# Patient Record
Sex: MALE | Race: WHITE | NOT HISPANIC OR LATINO | Employment: FULL TIME | URBAN - NONMETROPOLITAN AREA
[De-identification: names, ages, dates, MRNs, and addresses within clinical notes are randomized per-mention and may not be internally consistent; named-entity substitution may affect disease eponyms.]

---

## 2017-12-15 ENCOUNTER — OFFICE VISIT (OUTPATIENT)
Dept: URGENT CARE | Facility: PHYSICIAN GROUP | Age: 32
End: 2017-12-15
Payer: OTHER MISCELLANEOUS

## 2017-12-15 VITALS
SYSTOLIC BLOOD PRESSURE: 142 MMHG | TEMPERATURE: 98 F | WEIGHT: 178 LBS | HEIGHT: 68 IN | HEART RATE: 68 BPM | BODY MASS INDEX: 26.98 KG/M2 | OXYGEN SATURATION: 97 % | RESPIRATION RATE: 12 BRPM | DIASTOLIC BLOOD PRESSURE: 86 MMHG

## 2017-12-15 DIAGNOSIS — S05.02XA ABRASION OF LEFT CORNEA, INITIAL ENCOUNTER: ICD-10-CM

## 2017-12-15 DIAGNOSIS — S00.212A ABRASION OF LEFT EYELID, INITIAL ENCOUNTER: ICD-10-CM

## 2017-12-15 PROCEDURE — 99204 OFFICE O/P NEW MOD 45 MIN: CPT | Performed by: FAMILY MEDICINE

## 2017-12-15 RX ORDER — OFLOXACIN 3 MG/ML
SOLUTION/ DROPS OPHTHALMIC
Qty: 5 ML | Refills: 1 | Status: SHIPPED | OUTPATIENT
Start: 2017-12-15

## 2017-12-15 NOTE — PROGRESS NOTES
Chief Complaint:    Chief Complaint   Patient presents with   • Foreign Body in Eye       History of Present Illness:    Here with girlfriend. This is a new problem. Last night, felt like something got in his left eye. Still feels like something is in his left eye, mild-moderate severity. Wears contacts, but has them out now and using glasses. No change in vision. No similar prior episodes that have persisted this long.      Review of Systems:    Constitutional: Negative for fever, chills, and diaphoresis.   Eyes: See HPI.  ENT: Negative for ear pain, ear discharge, hearing loss, tinnitus, nasal congestion, nosebleeds, and sore throat.    Respiratory: Negative for cough, hemoptysis, sputum production, shortness of breath, wheezing, and stridor.    Cardiovascular: Negative for chest pain, palpitations, orthopnea, claudication, leg swelling, and PND.   Gastrointestinal: Negative for abdominal pain, nausea, vomiting, diarrhea, constipation, blood in stool, and melena.   Genitourinary: Negative for dysuria, urinary urgency, urinary frequency, hematuria, and flank pain.   Musculoskeletal: Negative for myalgias, joint pain, neck pain, and back pain.   Skin: Negative for rash and itching.   Neurological: Negative for dizziness, tingling, tremors, sensory change, speech change, focal weakness, seizures, loss of consciousness, and headaches.   Endo: Negative for polydipsia.   Heme: Does not bruise/bleed easily.   Psychiatric/Behavioral: Negative for depression, suicidal ideas, hallucinations, memory loss and substance abuse. The patient is not nervous/anxious and does not have insomnia.      Past Medical History:    History reviewed. No pertinent past medical history.    Past Surgical History:    History reviewed. No pertinent surgical history.    Social History:    Social History     Social History   • Marital status: Single     Spouse name: N/A   • Number of children: N/A   • Years of education: N/A     Social History Main  "Topics   • Smoking status: Never Smoker   • Smokeless tobacco: Never Used   • Alcohol use Not on file   • Drug use: Unknown   • Sexual activity: Not on file     Other Topics Concern   • Not on file     Social History Narrative   • No narrative on file       Family History:    History reviewed. No pertinent family history.    Medications:    No current outpatient prescriptions on file prior to visit.     No current facility-administered medications on file prior to visit.        Allergies:    No Known Allergies      Vitals:    Vitals:    12/15/17 1256   BP: 142/86   Pulse: 68   Resp: 12   Temp: 36.7 °C (98 °F)   SpO2: 97%   Weight: 80.7 kg (178 lb)   Height: 1.727 m (5' 8\")       Physical Exam:    Constitutional: Vital signs reviewed. Appears well-developed and well-nourished. No acute distress.   Eyes: Left eye: Fluorescein uptake noted under black light lateral to iris at 3 o'clock position and focal uptake left lower eyelid laterally. No foreign object noted in these areas. Sclera white, conjunctivae clear. PERRLA.  ENT: External ears normal. Hearing normal.   Neck: Neck supple.   Pulmonary/Chest: Respirations non-labored.   Musculoskeletal: Normal gait. No muscular atrophy or weakness.  Neurological: Alert and oriented to person, place, and time. CN 2-12 intact. Muscle tone normal. Coordination normal.   Skin: No rashes or lesions. Warm, dry, normal turgor.  Psychiatric: Normal mood and affect. Behavior is normal. Judgment and thought content normal.       Assessment / Plan:    1. Abrasion of left cornea, initial encounter  - ofloxacin (OCUFLOX) 0.3 % Solution; 1-2 DROPS TO LEFT EYE EVERY 2-4 HOURS WHILE AWAKE. USE FOR ONE EXTRA DAY AFTER BETTER.  Dispense: 5 mL; Refill: 1    2. Abrasion of left eyelid, initial encounter  - ofloxacin (OCUFLOX) 0.3 % Solution; 1-2 DROPS TO LEFT EYE EVERY 2-4 HOURS WHILE AWAKE. USE FOR ONE EXTRA DAY AFTER BETTER.  Dispense: 5 mL; Refill: 1      Discussed with them DDX and " management options.    Pics taken with their cell phones so he could see findings with Fluorescein stain and exam under black light. He possibly got foreign object in left eye last night, that has caused abrasion in left eye and left lower eyelid, but no persisting foreign object noted.    Agreeable to medication prescribed.    Advised to avoid contact lens use until eye is better (should be within 1 week or sooner).    Follow-up with PCP or urgent care if getting worse or not better with time and above.